# Patient Record
Sex: FEMALE | Race: WHITE | NOT HISPANIC OR LATINO | URBAN - METROPOLITAN AREA
[De-identification: names, ages, dates, MRNs, and addresses within clinical notes are randomized per-mention and may not be internally consistent; named-entity substitution may affect disease eponyms.]

---

## 2021-09-02 ENCOUNTER — OFFICE VISIT - RIVER FALLS (OUTPATIENT)
Dept: FAMILY MEDICINE | Facility: CLINIC | Age: 41
End: 2021-09-02
Payer: COMMERCIAL

## 2021-09-02 PROCEDURE — U0003 INFECTIOUS AGENT DETECTION BY NUCLEIC ACID (DNA OR RNA); SEVERE ACUTE RESPIRATORY SYNDROME CORONAVIRUS 2 (SARS-COV-2) (CORONAVIRUS DISEASE [COVID-19]), AMPLIFIED PROBE TECHNIQUE, MAKING USE OF HIGH THROUGHPUT TECHNOLOGIES AS DESCRIBED BY CMS-2020-01-R: HCPCS | Mod: ORL

## 2021-09-02 ASSESSMENT — MIFFLIN-ST. JEOR: SCORE: 1578.07

## 2021-09-03 ENCOUNTER — LAB REQUISITION (OUTPATIENT)
Dept: LAB | Facility: CLINIC | Age: 41
End: 2021-09-03
Payer: COMMERCIAL

## 2021-09-03 DIAGNOSIS — U07.1 COVID-19: ICD-10-CM

## 2021-09-04 LAB — SARS-COV-2 RNA RESP QL NAA+PROBE: NEGATIVE

## 2021-09-07 LAB — SARS-COV-2 RNA RESP QL NAA+PROBE: NEGATIVE

## 2022-02-11 VITALS
BODY MASS INDEX: 33.32 KG/M2 | HEIGHT: 65 IN | DIASTOLIC BLOOD PRESSURE: 80 MMHG | RESPIRATION RATE: 20 BRPM | OXYGEN SATURATION: 98 % | SYSTOLIC BLOOD PRESSURE: 116 MMHG | TEMPERATURE: 99.1 F | WEIGHT: 200 LBS | HEART RATE: 72 BPM

## 2022-02-16 NOTE — PROGRESS NOTES
Patient:   PHYLICIA LONGORIA            MRN: [id number]            FIN: [id number]               Age:   41 years     Sex:  Female     :  1980   Associated Diagnoses:   Close exposure to COVID-19 virus; Parotitis   Author:   Colin Osborne PA-C      Chief Complaint   2021 5:45 PM CDT     New pt here for RIght swollen jaw,fever,chills,body aches,headache 5-6 days      History of Present Illness   Chief complaint and symptoms noted above and confirmed with patient   6 day hx of right swollen beneath right side of jaw, fever, chills, body aches, headache  using tylenol and sinus med      she has not had the Covid vaccine      Review of Systems   Constitutional:  Fever, Chills, body aches.    Ear/Nose/Mouth/Throat:  jaw pain.    Respiratory:  Negative.    Gastrointestinal:  Negative.       Health Status   Allergies:    Allergic Reactions (Selected)  No Known Medication Allergies   Medications: not on any regular medications   Problem list:    All Problems  Tobacco user / SNOMED CT 885715519 / Probable  Obesity / SNOMED CT 4430680996 / Probable      Histories   Past Medical History:    No active or resolved past medical history items have been selected or recorded.   Family History:    No family history items have been selected or recorded.   Procedure history:    pilonidal cystectomy on 2000 at 19 Years.   Social History:        Electronic Cigarette/Vaping Assessment            Electronic Cigarette Use: Use, within last 90 days.      Alcohol Assessment            Current, 5 per episode, 1-2 times per week      Tobacco Assessment            Former smoker, quit more than 30 days ago            Current, Cigarettes      Substance Abuse Assessment            Never      Employment and Education Assessment            Employed, Work/School description: [employer].      Home and Environment Assessment            Marital status: Single.      Nutrition and Health Assessment            Type of diet: Regular.       Exercise and Physical Activity Assessment            Exercise frequency: Never.        Physical Examination   Vital Signs   9/2/2021 5:45 PM CDT Temperature Tympanic 99.1 DegF    Peripheral Pulse Rate 72 bpm    Pulse Site Radial artery    Respiratory Rate 20 br/min    Systolic Blood Pressure 116 mmHg    Diastolic Blood Pressure 80 mmHg    Mean Arterial Pressure 92 mmHg    BP Site Right arm    Oxygen Saturation 98 %      Measurements from flowsheet : Measurements   9/2/2021 5:45 PM CDT Height Measured - Standard 65 in    Weight Measured - Standard 200 lb    BSA 2.04 m2    Body Mass Index 33.28 kg/m2  HI      General:  No acute distress.    HENT:  Tympanic membranes are clear, No sinus tenderness, inflammation and swelling of right cheek,  enlarged right submandibular gland, tender to touch.    Neck:  Supple, Non-tender, No lymphadenopathy.    Respiratory:  Lungs are clear to auscultation.    Cardiovascular:  Normal rate, Regular rhythm, No murmur.       Impression and Plan   Diagnosis     Close exposure to COVID-19 virus (IXH01-FH Z20.822).     Parotitis (GHQ96-US K11.20).     Summary:  will treat as parotitis with augmentin, sour balls, lemon drops, salt water gargles, tylenol, heat over cheek  will also test for Covid today  follow up if not improving.    Orders     Orders   Pharmacy:  Augmentin 875 mg-125 mg oral tablet (Prescribe): = 1 tab(s), Oral, q12 hrs, x 10 day(s), Instructions: with food or milk, # 20 tab(s), 0 Refill(s), Type: Acute, Pharmacy: "[id number], 1 tab(s) Oral q12 hrs,x10 day(s),Instr:with food or milk, 65, in, 9/2/2021 5:45 PM CDT, Height Measured, 200, lb, 9/2/2021 5:45 PM CDT, Weight Measured.     Orders   Requests (Lab):  SARS-CoV-2 RNA (COVID-19), Qualitative NAAT (Request) (Order): Close exposure to COVID-19 virus.     Orders   Charges (Evaluation and Management):  [id number] office o/p new low 30-44 min (Charge) (Order): Quantity: 1, Parotitis  Close exposure to COVID-19  virus.

## 2022-02-16 NOTE — NURSING NOTE
Comprehensive Intake Entered On:  9/2/2021 5:54 PM CDT    Performed On:  9/2/2021 5:45 PM CDT by Abraham Hathaway CMA               Summary   Chief Complaint :   New pt here for RIght swollen jaw,fever,chills,body aches,headache 5-6 days   Weight Measured :   200 lb(Converted to: 200 lb 0 oz, 90.718 kg)    Height Measured :   65 in(Converted to: 5 ft 5 in, 165.10 cm)    Body Mass Index :   33.28 kg/m2 (HI)    Body Surface Area :   2.04 m2   Systolic Blood Pressure :   116 mmHg   Diastolic Blood Pressure :   80 mmHg   Mean Arterial Pressure :   92 mmHg   Peripheral Pulse Rate :   72 bpm   BP Site :   Right arm   Pulse Site :   Radial artery   Temperature Tympanic :   99.1 DegF(Converted to: 37.3 DegC)    Respiratory Rate :   20 br/min   Oxygen Saturation :   98 %   Abraham Hathaway CMA - 9/2/2021 5:45 PM CDT   Health Status   Allergies Verified? :   Yes   Medication History Verified? :   Yes   Medical History Verified? :   Yes   Pre-Visit Planning Status :   Not completed   Abraham Hathaway CMA - 9/2/2021 5:45 PM CDT   Meds / Allergies   (As Of: 9/2/2021 5:54:12 PM CDT)   Allergies (Active)   No Known Medication Allergies  Estimated Onset Date:   Unspecified ; Created By:   Martina Olsen MA; Reaction Status:   Active ; Category:   Drug ; Substance:   No Known Medication Allergies ; Type:   Allergy ; Updated By:   Martina Olsen MA; Source:   Patient ; Reviewed Date:   9/2/2021 5:54 PM CDT        Medication List   (As Of: 9/2/2021 5:54:12 PM CDT)   Prescription/Discharge Order    ondansetron  :   ondansetron ; Status:   Processing ; Ordered As Mnemonic:   Zofran ODT 4 mg oral tablet, disintegrating ; Ordering Provider:   Andrea Russell MD; Action Display:   Complete ; Catalog Code:   ondansetron ; Order Dt/Tm:   9/2/2021 5:46:06 PM CDT            Procedures / Surgeries        -    Procedure History   (As Of: 9/2/2021 5:54:12 PM CDT)     Procedure Dt/Tm:   6/22/2000 ; Anesthesia Minutes:   0 ; Procedure Name:   pilonidal  cystectomy ; Procedure Minutes:   0            Social History   Social History   (As Of: 9/2/2021 5:54:12 PM CDT)   Alcohol:        Current, 5 per episode, 1-2 times per week   (Last Updated: 12/31/2013 12:46:44 PM CST by Christiane Alfredo)          Tobacco:        Current, Cigarettes   (Last Updated: 12/31/2013 12:46:54 PM CST by Christiane Alfredo)   Former smoker, quit more than 30 days ago   Comments:  9/2/2021 5:50 PM - Abraham Hathaway CMA: quit 4/2021   (Last Updated: 9/2/2021 5:50:57 PM CDT by Abraham Hathaway CMA)          Electronic Cigarette/Vaping:        Electronic Cigarette Use: Use, within last 90 days.   (Last Updated: 9/2/2021 5:51:37 PM CDT by Abraham Hathaway CMA)          Substance Abuse:        Never   (Last Updated: 12/31/2013 12:47:01 PM CST by Christiane Alfredo)          Employment/School:        Employed, Work/School description: [employer].   (Last Updated: 12/31/2013 12:46:12 PM CST by Christiane Alfredo)          Home/Environment:        Marital status: Single.   (Last Updated: 12/31/2013 12:45:42 PM CST by Christiane Alfredo)          Nutrition/Health:        Type of diet: Regular.   (Last Updated: 12/31/2013 12:47:12 PM CST by Christiane Alfredo)          Exercise:        Exercise frequency: Never.   (Last Updated: 12/31/2013 12:47:31 PM CST by Christiane Alfredo)

## 2022-02-16 NOTE — NURSING NOTE
Pt notified via telephone that COVID testing done was negative. Pt voiced understanding and will call the clinic with any further questions or concerns.